# Patient Record
Sex: FEMALE | Race: BLACK OR AFRICAN AMERICAN | NOT HISPANIC OR LATINO | Employment: FULL TIME | URBAN - METROPOLITAN AREA
[De-identification: names, ages, dates, MRNs, and addresses within clinical notes are randomized per-mention and may not be internally consistent; named-entity substitution may affect disease eponyms.]

---

## 2017-01-24 ENCOUNTER — COMMUNICATION - HEALTHEAST (OUTPATIENT)
Dept: FAMILY MEDICINE | Facility: CLINIC | Age: 32
End: 2017-01-24

## 2017-01-24 DIAGNOSIS — F32.9 MAJOR DEPRESSION: ICD-10-CM

## 2017-02-16 ENCOUNTER — COMMUNICATION - HEALTHEAST (OUTPATIENT)
Dept: FAMILY MEDICINE | Facility: CLINIC | Age: 32
End: 2017-02-16

## 2017-02-16 DIAGNOSIS — F32.9 MAJOR DEPRESSION: ICD-10-CM

## 2017-02-17 ENCOUNTER — COMMUNICATION - HEALTHEAST (OUTPATIENT)
Dept: FAMILY MEDICINE | Facility: CLINIC | Age: 32
End: 2017-02-17

## 2017-02-17 DIAGNOSIS — F32.9 MAJOR DEPRESSION: ICD-10-CM

## 2017-03-26 ENCOUNTER — COMMUNICATION - HEALTHEAST (OUTPATIENT)
Dept: FAMILY MEDICINE | Facility: CLINIC | Age: 32
End: 2017-03-26

## 2017-03-26 DIAGNOSIS — F32.9 MAJOR DEPRESSION: ICD-10-CM

## 2017-03-28 ENCOUNTER — COMMUNICATION - HEALTHEAST (OUTPATIENT)
Dept: FAMILY MEDICINE | Facility: CLINIC | Age: 32
End: 2017-03-28

## 2017-03-28 DIAGNOSIS — F32.9 MAJOR DEPRESSION: ICD-10-CM

## 2017-04-05 ENCOUNTER — COMMUNICATION - HEALTHEAST (OUTPATIENT)
Dept: FAMILY MEDICINE | Facility: CLINIC | Age: 32
End: 2017-04-05

## 2017-04-13 ENCOUNTER — COMMUNICATION - HEALTHEAST (OUTPATIENT)
Dept: FAMILY MEDICINE | Facility: CLINIC | Age: 32
End: 2017-04-13

## 2017-04-13 ENCOUNTER — OFFICE VISIT - HEALTHEAST (OUTPATIENT)
Dept: FAMILY MEDICINE | Facility: CLINIC | Age: 32
End: 2017-04-13

## 2017-04-13 DIAGNOSIS — F32.9 MAJOR DEPRESSION: ICD-10-CM

## 2017-04-13 RX ORDER — FERROUS SULFATE 325(65) MG
1 TABLET ORAL DAILY
Status: SHIPPED | COMMUNITY
Start: 2017-04-13

## 2017-10-12 ENCOUNTER — COMMUNICATION - HEALTHEAST (OUTPATIENT)
Dept: FAMILY MEDICINE | Facility: CLINIC | Age: 32
End: 2017-10-12

## 2017-10-12 DIAGNOSIS — F32.9 MAJOR DEPRESSION: ICD-10-CM

## 2017-10-27 ENCOUNTER — OFFICE VISIT - HEALTHEAST (OUTPATIENT)
Dept: FAMILY MEDICINE | Facility: CLINIC | Age: 32
End: 2017-10-27

## 2017-10-27 DIAGNOSIS — Z23 NEEDS FLU SHOT: ICD-10-CM

## 2017-10-27 DIAGNOSIS — F32.9 MAJOR DEPRESSION: ICD-10-CM

## 2017-10-30 ENCOUNTER — COMMUNICATION - HEALTHEAST (OUTPATIENT)
Dept: FAMILY MEDICINE | Facility: CLINIC | Age: 32
End: 2017-10-30

## 2017-10-31 ENCOUNTER — COMMUNICATION - HEALTHEAST (OUTPATIENT)
Dept: SCHEDULING | Facility: CLINIC | Age: 32
End: 2017-10-31

## 2017-11-01 ENCOUNTER — RECORDS - HEALTHEAST (OUTPATIENT)
Dept: ADMINISTRATIVE | Facility: OTHER | Age: 32
End: 2017-11-01

## 2017-11-02 ENCOUNTER — RECORDS - HEALTHEAST (OUTPATIENT)
Dept: ADMINISTRATIVE | Facility: OTHER | Age: 32
End: 2017-11-02

## 2017-11-09 ENCOUNTER — COMMUNICATION - HEALTHEAST (OUTPATIENT)
Dept: FAMILY MEDICINE | Facility: CLINIC | Age: 32
End: 2017-11-09

## 2018-02-08 ENCOUNTER — COMMUNICATION - HEALTHEAST (OUTPATIENT)
Dept: FAMILY MEDICINE | Facility: CLINIC | Age: 33
End: 2018-02-08

## 2018-02-08 DIAGNOSIS — F32.9 MAJOR DEPRESSION: ICD-10-CM

## 2018-04-09 ENCOUNTER — COMMUNICATION - HEALTHEAST (OUTPATIENT)
Dept: FAMILY MEDICINE | Facility: CLINIC | Age: 33
End: 2018-04-09

## 2018-04-09 DIAGNOSIS — F32.9 MAJOR DEPRESSION: ICD-10-CM

## 2018-04-09 RX ORDER — DULOXETIN HYDROCHLORIDE 30 MG/1
30 CAPSULE, DELAYED RELEASE ORAL 2 TIMES DAILY
Qty: 60 CAPSULE | Refills: 5 | Status: SHIPPED | OUTPATIENT
Start: 2018-04-09 | End: 2023-04-20

## 2019-10-11 ENCOUNTER — COMMUNICATION - HEALTHEAST (OUTPATIENT)
Dept: FAMILY MEDICINE | Facility: CLINIC | Age: 34
End: 2019-10-11

## 2021-05-30 VITALS — WEIGHT: 128 LBS | BODY MASS INDEX: 20.98 KG/M2

## 2021-05-31 VITALS — WEIGHT: 118 LBS | BODY MASS INDEX: 19.34 KG/M2

## 2021-06-10 NOTE — PROGRESS NOTES
Assessment and Plan    1. Major depression - improved on medication, but she is still dealing with stressors - declines counseling for now, as she has not time  - DULoxetine (CYMBALTA) 20 MG capsule; Take 1 capsule (20 mg total) by mouth 2 (two) times a day.  Dispense: 180 capsule; Refill: 1    Marycarmen River MD     -------------------------------------------    Chief Complaint   Patient presents with     Depression     I saw Carin on July 1, 2016 for depression and started her on duloxetine.  She thought she did not need a follow up for a year.  On her last [facility] email I did refill her duloxetine for a month but asked her to come in for a follow up visit.  However she has been having problems with the pharmacy she uses  - - Was having trouble with the pharmacy stocking the medicaiton and communicating with her.  They did not fill her 3/28/17 prescription. She also used to work there which makes her extra-uncomfortable, so she has been without duloxetine for  3-4 weeks.  She DOES feel it helped    Little interest or pleasure in doing things: More than half the days  Feeling down, depressed, or hopeless: More than half the days  Trouble falling or staying asleep, or sleeping too much: Nearly every day  Feeling tired or having little energy: More than half the days  Poor appetite or overeating: Not at all  Feeling bad about yourself - or that you are a failure or have let yourself or your family down: Nearly every day  Trouble concentrating on things, such as reading the newspaper or watching television: Nearly every day  Moving or speaking so slowly that other people could have noticed. Or the opposite - being so fidgety or restless that you have been moving around a lot more than usual: Not at all  Thoughts that you would be better off dead, or of hurting yourself in some way: Several days  PHQ-9 Total Score: 16  If you checked off any problems, how difficult have these problems made it for you to do your work,  "take care of things at home, or get along with other people?: Very difficult    Carin felt like things were going well until January this year - then she started school full time - she is going to [employer] for [employer] science.  She has had to find  so that was stressful.  She thought she could handle it all, but then things started to fall apart and not get done around the house.  She had to withdraw from two classes because she was failing.  She dropped down to part-time - which was better but she still feels bad about this.    Her  scaled back on work so he could help around the house - but he doesn't do as much (\"less money and more mess\") - and manages kids differently.  She lives across st[place] from mother in law and a few houses down from sister-in-law - who was sending their kids over.  She has been more clear about limits.    She has a 6 year old son, and 2 year old daughter.  Mother-in-law DOES help out, but she doesn't want to rely on her all the time    She has also told her Mom to back off - Mom wants to spend a LOT of time talking to her (2 hours a day?).  Mom was also saying she didn't believe she was in school, and makes Carin feel guilty for not being able to talk to her.  Carin mentions that she has a diagnosis of borderline personality disorder.     One of her sister-in-laws is supportive and helps her get to appointments    Carin does say her libido is improved since I first saw her      Patient Active Problem List   Diagnosis     Family history of sickle cell anemia     AS (sickle cell trait)     History of anemia     Seasonal allergies         Current Outpatient Prescriptions:      ETONOGESTREL (IMPLANON SDRM), by Subdermal route., Disp: , Rfl:      ferrous sulfate 325 (65 FE) MG tablet, Take 1 tablet by mouth daily., Disp: , Rfl:      UNABLE TO FIND, Take 1 capsule by mouth daily. Med Name: Marti Root, Disp: , Rfl:      DULoxetine (CYMBALTA) 20 MG capsule, Take 1 " capsule (20 mg total) by mouth 2 (two) times a day., Disp: 180 capsule, Rfl: 1      Health Maintenance Due   Topic Date Due     INFLUENZA VACCINE RULE BASED (1) 08/01/2016     PAP SMEAR  02/18/2017     Health Maintenance reviewed -reminded pap due    History   Smoking Status     Never Smoker   Smokeless Tobacco     Never Used       History   Alcohol Use No       Vitals:    04/13/17 1533   BP: 92/56   Pulse: 76   Resp: 16     Body mass index is 20.98 kg/(m^2).     EXAM:    General appearance - alert, well appearing, and in no distress and tired appearing  Mental status - slightly constricted mood, normal behavior, speech, dress, motor activity, and thought processes

## 2021-06-13 NOTE — PROGRESS NOTES
Assessment and Plan    1. Major depression  Could be a little better - increase dose from 20 mg daily to 40 mg daily - If Carin tolerates this I can give a 90 day refill  - DULoxetine 40 mg CpDR; Take 40 mg by mouth daily.  Dispense: 30 capsule; Refill: 2    2. Needs flu shot  - Influenza, Seasonal Quad, Preservative Free 36+ Months    Re teeth grinding - advised dental visit and gave resources for finding dentist office that takes JORGE River MD     -------------------------------------------    Chief Complaint   Patient presents with     Depression     Carin is taking Duloxetine for depression - I had started her on this medication in 6/16 as she also noted lack of libido.  It has helped though at last visit she mentioned some family stressors that were affecting her mood. However she was too busy to pursue counseling    Little interest or pleasure in doing things: Nearly every day  Feeling down, depressed, or hopeless: Several days  Trouble falling or staying asleep, or sleeping too much: Not at all  Feeling tired or having little energy: Nearly every day  Poor appetite or overeating: Several days  Feeling bad about yourself - or that you are a failure or have let yourself or your family down: More than half the days  Trouble concentrating on things, such as reading the newspaper or watching television: Several days  Moving or speaking so slowly that other people could have noticed. Or the opposite - being so fidgety or restless that you have been moving around a lot more than usual: Not at all  Thoughts that you would be better off dead, or of hurting yourself in some way: Not at all  PHQ-9 Total Score: 11    Note that her previous score was 16 in 4/2017    She had planned to go back to school to study computer science but found that this with home responsibilities was to much.  She has a 7 year old son with Down's Syndrome and found that she did not have time to communicate with his  "teacher.    Since then other things have gotten better.  She has a Mom with probable borderline personality disorder - we talked at last visit about setting firm rules (such as when she could call and for what reasons) .  She says her Mom is not liking new rules she has to follow - she cannot call all the time, she cannot make it seem like an emergency - but things are better.  Carin notes that she has had a lot of family manipulation \"if you don't do this for me I will hurt myself,\" but she is getting tougher about dealing with this.    She and her  are now living in a bigger and a nicer house.  She has a better , more comfortable space.  Before they moved they were across the street  from mother in law and husbands sister.  Carin was frequently asked to mind other kids, with no return of the favor.  Now she trades more fairly only with family members who are willing to give back and have common goals for kids.    Her  is getting better at helping around the house/supporting      Other concerns:      Left jaw pain: her  says that she has always ground her teeth and now she has broken some.  She had gone through dental guards - they only last 3 weeks.  She has not yet seen a dentist    Patient Active Problem List   Diagnosis     Family history of sickle cell anemia     AS (sickle cell trait)     History of anemia     Seasonal allergies       Current Outpatient Prescriptions on File Prior to Visit   Medication Sig Dispense Refill     ETONOGESTREL (IMPLANON SDRM) by Subdermal route.       ferrous sulfate 325 (65 FE) MG tablet Take 1 tablet by mouth daily.       UNABLE TO FIND Take 1 capsule by mouth daily. Med Name: Marti Castorena       No current facility-administered medications on file prior to visit.            There are no preventive care reminders to display for this patient.      History   Smoking Status     Never Smoker   Smokeless Tobacco     Never Used       History   Alcohol Use No "         Vitals:    10/27/17 1458   BP: 92/58   Pulse: 66   SpO2: 99%     Body mass index is 19.34 kg/(m^2).     EXAM:    General appearance - alert, well appearing, and in no distress  Mental status - normal mood, behavior, speech, dress, motor activity, and thought processes

## 2021-08-21 ENCOUNTER — HEALTH MAINTENANCE LETTER (OUTPATIENT)
Age: 36
End: 2021-08-21

## 2021-10-16 ENCOUNTER — HEALTH MAINTENANCE LETTER (OUTPATIENT)
Age: 36
End: 2021-10-16

## 2022-10-01 ENCOUNTER — HEALTH MAINTENANCE LETTER (OUTPATIENT)
Age: 37
End: 2022-10-01

## 2023-04-14 ASSESSMENT — PATIENT HEALTH QUESTIONNAIRE - PHQ9
10. IF YOU CHECKED OFF ANY PROBLEMS, HOW DIFFICULT HAVE THESE PROBLEMS MADE IT FOR YOU TO DO YOUR WORK, TAKE CARE OF THINGS AT HOME, OR GET ALONG WITH OTHER PEOPLE: EXTREMELY DIFFICULT
SUM OF ALL RESPONSES TO PHQ QUESTIONS 1-9: 16
SUM OF ALL RESPONSES TO PHQ QUESTIONS 1-9: 16

## 2023-04-18 ENCOUNTER — VIRTUAL VISIT (OUTPATIENT)
Dept: FAMILY MEDICINE | Facility: CLINIC | Age: 38
End: 2023-04-18
Payer: COMMERCIAL

## 2023-04-18 ENCOUNTER — TELEPHONE (OUTPATIENT)
Dept: FAMILY MEDICINE | Facility: CLINIC | Age: 38
End: 2023-04-18

## 2023-04-18 DIAGNOSIS — F33.0 MILD EPISODE OF RECURRENT MAJOR DEPRESSIVE DISORDER (H): Primary | ICD-10-CM

## 2023-04-18 PROCEDURE — 96127 BRIEF EMOTIONAL/BEHAV ASSMT: CPT | Mod: VID | Performed by: FAMILY MEDICINE

## 2023-04-18 PROCEDURE — 99203 OFFICE O/P NEW LOW 30 MIN: CPT | Mod: VID | Performed by: FAMILY MEDICINE

## 2023-04-18 RX ORDER — DULOXETIN HYDROCHLORIDE 30 MG/1
30 CAPSULE, DELAYED RELEASE ORAL 2 TIMES DAILY
Qty: 180 CAPSULE | Refills: 0 | Status: SHIPPED | OUTPATIENT
Start: 2023-04-18 | End: 2023-04-18

## 2023-04-18 ASSESSMENT — PATIENT HEALTH QUESTIONNAIRE - PHQ9
SUM OF ALL RESPONSES TO PHQ QUESTIONS 1-9: 16
10. IF YOU CHECKED OFF ANY PROBLEMS, HOW DIFFICULT HAVE THESE PROBLEMS MADE IT FOR YOU TO DO YOUR WORK, TAKE CARE OF THINGS AT HOME, OR GET ALONG WITH OTHER PEOPLE: EXTREMELY DIFFICULT

## 2023-04-18 NOTE — PROGRESS NOTES
Carin is a 37 year old who is being evaluated via a billable video visit.      How would you like to obtain your AVS? MyChart  If the video visit is dropped, the invitation should be resent by: Text to cell phone: [phone]  Will anyone else be joining your video visit? No        Assessment & Plan     Mild episode of recurrent major depressive disorder (H)  - Comprehensive metabolic panel (BMP + Alb, Alk Phos, ALT, AST, Total. Bili, TP)  I prescribed duloxetine 30 mg twice a day as this is what Carin said she previously tolerated.  Specifically she said taking 60 mg at once was too strong show she found 30 mg twice a day more tolerable.  I got a note from the pharmacy that her insurance does not cover 2 pills daily.  Therefore in a different encounter I sent a prescription for 7 pills at 30 mg, and then she can try going up to 60 mg if she tolerates this.     Depression Screening Follow Up        4/14/2023     2:33 PM   PHQ   PHQ-9 Total Score 16   Q9: Thoughts of better off dead/self-harm past 2 weeks Not at all      Return in about 4 weeks (around 5/16/2023) for Routine preventive, depression follow up.      Marycarmen River MD  [facility]    Subjective   Carin is a 37 year old, presenting for the following health issues:  Recheck Medication and Depression        Depression Followup    How are you doing with your depression since your last visit? No change    Are you having other symptoms that might be associated with depression? No    Have you had a significant life event?  Relationship Concerns and Job Concerns     Are you feeling anxious or having panic attacks?   No    Do you have any concerns with your use of alcohol or other drugs? No    I have not seen [employer] since 2017 so she catches me up on her life.  When I last talked to her she was pursuing training in computer science, but she says ultimately she did not have enough money for school and home and her kids  were not giving her enough care.  Unfortunately, her  spent some about money carelessly.    She is pursuing divorce from her .  He was not helping enough with running the household and not contributing enough financially.  The house was too big to maintain, and while she understands picking up after little kids, she does not want to  after her .  When she has spoken up about her concerns things are 2/10 and her  seems aggressive.  He backs off if she really gets to the point where she is crying; and then he discusses trying to make things work.  Jennifer Park does not feel she should have to cry to make him feel better.  She is currently working 2 jobs.    Also when we last spoke she rated described her mother's controlling nature, which almost sounded to me like borderline personality.  She says that she worked with her mom for a while at [employer]-this was nice but not too taxing.  However it was long hours and she wanted to be home more for her children.    The pandemic was particularly stressful as one of her sons have Down's syndrome.  Virtual school was challenging and also not good for his development.  Her  was not advocating for their son.  She had to fight very hard to get him back in school last year when schools were opening again.    Jennifer Park is interested in starting medication to address her depression.  She says that the duloxetine she previously took made her sweat, but she did feel less trapped when she was taking it.  She has not used it for about 2 years.      Used to check her blood pressure - nurses always telling her it was normal          4/18/2023     6:48 AM   Additional Questions   Roomed by Britni   Accompanied by Alone     History of Present Illness       Mental Health Follow-up:  Patient presents to follow-up on Depression.Patient's depression since last visit has been:  Medium  The patient is not having other symptoms associated with  depression.      Any significant life events: relationship concerns, financial concerns, housing concerns and grief or loss  Patient is not feeling anxious or having panic attacks.  Patient has no concerns about alcohol or drug use.    She eats 0-1 servings of fruits and vegetables daily.She consumes 1 sweetened beverage(s) daily.She exercises with enough effort to increase her heart rate 10 to 19 minutes per day.  She exercises with enough effort to increase her heart rate 3 or less days per week. She is missing 7 dose(s) of medications per week.  She is not taking prescribed medications regularly due to other.    Today's PHQ-9         PHQ-9 Total Score: 16    PHQ-9 Q9 Thoughts of better off dead/self-harm past 2 weeks :   Not at all    How difficult have these problems made it for you to do your work, take care of things at home, or get along with other people: Extremely difficult           Social History     Tobacco Use     Smoking status: Never     Smokeless tobacco: Never   Vaping Use     Vaping status: Never Used   Substance Use Topics     Alcohol use: No     Drug use: No         4/14/2023     2:33 PM   PHQ   PHQ-9 Total Score 16   Q9: Thoughts of better off dead/self-harm past 2 weeks Not at all          View : No data to display.                  4/14/2023     2:33 PM   Last PHQ-9   1.  Little interest or pleasure in doing things 2   2.  Feeling down, depressed, or hopeless 3   3.  Trouble falling or staying asleep, or sleeping too much 3   4.  Feeling tired or having little energy 2   5.  Poor appetite or overeating 3   6.  Feeling bad about yourself 3   7.  Trouble concentrating 0   8.  Moving slowly or restless 0   Q9: Thoughts of better off dead/self-harm past 2 weeks 0   PHQ-9 Total Score 16          View : No data to display.                  Objective           Vitals:  No vitals were obtained today due to virtual visit.    Physical Exam   GENERAL: Healthy, alert and no distress  EYES: Eyes grossly  normal to inspection.  No discharge or erythema, or obvious scleral/conjunctival abnormalities.  RESP: No audible wheeze, cough, or visible cyanosis.  No visible retractions or increased work of breathing.    SKIN: Visible skin clear. No significant rash, abnormal pigmentation or lesions.  NEURO: Cranial nerves grossly intact.  Mentation and speech appropriate for age.  PSYCH: mentation appears normal and Carin appear tired and stressed, but able to smile    Video-Visit Details    Type of service:  Video Visit   Video Start Time: 7:00 AM  Video End Time:7:22 AM    Originating Location (pt. Location): Home  Distant Location (provider location):  On-site  Platform used for Video Visit: [employer]

## 2023-04-19 ENCOUNTER — LAB (OUTPATIENT)
Dept: LAB | Facility: CLINIC | Age: 38
End: 2023-04-19
Payer: COMMERCIAL

## 2023-04-19 DIAGNOSIS — F33.0 MILD EPISODE OF RECURRENT MAJOR DEPRESSIVE DISORDER (H): ICD-10-CM

## 2023-04-19 LAB
ALBUMIN SERPL BCG-MCNC: 4.7 G/DL (ref 3.5–5.2)
ALP SERPL-CCNC: 61 U/L (ref 35–104)
ALT SERPL W P-5'-P-CCNC: 9 U/L (ref 10–35)
ANION GAP SERPL CALCULATED.3IONS-SCNC: 15 MMOL/L (ref 7–15)
AST SERPL W P-5'-P-CCNC: 21 U/L (ref 10–35)
BILIRUB SERPL-MCNC: 0.4 MG/DL
BUN SERPL-MCNC: 5.3 MG/DL (ref 6–20)
CALCIUM SERPL-MCNC: 9.3 MG/DL (ref 8.6–10)
CHLORIDE SERPL-SCNC: 101 MMOL/L (ref 98–107)
CREAT SERPL-MCNC: 0.82 MG/DL (ref 0.51–0.95)
DEPRECATED HCO3 PLAS-SCNC: 21 MMOL/L (ref 22–29)
GFR SERPL CREATININE-BSD FRML MDRD: >90 ML/MIN/1.73M2
GLUCOSE SERPL-MCNC: 120 MG/DL (ref 70–99)
POTASSIUM SERPL-SCNC: 4.5 MMOL/L (ref 3.4–5.3)
PROT SERPL-MCNC: 7.3 G/DL (ref 6.4–8.3)
SODIUM SERPL-SCNC: 137 MMOL/L (ref 136–145)

## 2023-04-19 PROCEDURE — 36415 COLL VENOUS BLD VENIPUNCTURE: CPT

## 2023-04-19 PROCEDURE — 80053 COMPREHEN METABOLIC PANEL: CPT

## 2023-04-24 NOTE — TELEPHONE ENCOUNTER
PA Initiation    Medication: DULoxetine (CYMBALTA) 30 MG capsule - initiated  Insurance Company: [employer] ([facility]) - Phone [phone] Fax [phone]  Pharmacy Filling the Rx: [id number] - [place], [place] - [address]  Filling Pharmacy Phone: [phone]  Filling Pharmacy Fax:    Start Date: 4/24/2023

## 2023-04-26 NOTE — TELEPHONE ENCOUNTER
Prior Authorization Approval    Authorization Effective Date: 4/24/2023  Authorization Expiration Date: 4/24/2023  Medication: DULoxetine (CYMBALTA) 30 MG capsule - APPROVED  Approved Dose/Quantity: 60 FOR 30 DAYS  Insurance Company: [employer] ([facility]) - Phone [phone] Fax [phone]  Which Pharmacy is filling the prescription (Not needed for infusion/clinic administered): [id number] - [place], [place] - [address] [place] [address]  Pharmacy Notified: Yes  Patient Notified: Yes Pharmacy will notify patient once order is ready.

## 2023-07-11 ENCOUNTER — ALLIED HEALTH/NURSE VISIT (OUTPATIENT)
Dept: FAMILY MEDICINE | Facility: CLINIC | Age: 38
End: 2023-07-11
Payer: COMMERCIAL

## 2023-07-11 VITALS
BODY MASS INDEX: 19.99 KG/M2 | OXYGEN SATURATION: 100 % | WEIGHT: 122 LBS | DIASTOLIC BLOOD PRESSURE: 75 MMHG | RESPIRATION RATE: 24 BRPM | TEMPERATURE: 98.5 F | HEART RATE: 98 BPM | SYSTOLIC BLOOD PRESSURE: 113 MMHG

## 2023-07-11 DIAGNOSIS — Z23 HIGH PRIORITY FOR 2019-NCOV VACCINE: Primary | ICD-10-CM

## 2023-07-11 PROCEDURE — 0134A COVID-19 BIVALENT 18+ (MODERNA): CPT

## 2023-07-11 PROCEDURE — 99207 PR NO CHARGE NURSE ONLY: CPT

## 2023-07-11 PROCEDURE — 91313 COVID-19 BIVALENT 18+ (MODERNA): CPT

## 2023-07-11 NOTE — PROGRESS NOTES
Prior to immunization administration, verified patients identity using patient s name and date of birth. Please see Immunization Activity for additional information.     Screening Questionnaire for Adult Immunization    Are you sick today?   No   Do you have allergies to medications, food, a vaccine component or latex?   Yes   Have you ever had a serious reaction after receiving a vaccination?   No   Do you have a long-term health problem with heart, lung, kidney, or metabolic disease (e.g., diabetes), asthma, a blood disorder, no spleen, complement component deficiency, a cochlear implant, or a spinal fluid leak?  Are you on long-term aspirin therapy?   No   Do you have cancer, leukemia, HIV/AIDS, or any other immune system problem?   No   Do you have a parent, brother, or sister with an immune system problem?   No   In the past 3 months, have you taken medications that affect  your immune system, such as prednisone, other steroids, or anticancer drugs; drugs for the treatment of rheumatoid arthritis, Crohn s disease, or psoriasis; or have you had radiation treatments?   No   Have you had a seizure, or a brain or other nervous system problem?   No   During the past year, have you received a transfusion of blood or blood    products, or been given immune (gamma) globulin or antiviral drug?   No   For women: Are you pregnant or is there a chance you could become       pregnant during the next month?   No   Have you received any vaccinations in the past 4 weeks?   No     Immunization questionnaire was positive for at least one answer.  Notified Dr. Adkins.    Nilesh Castillo Bivalent 18+.     Patient instructed to remain in clinic for 15 minutes afterwards, and to report any adverse reactions.     Screening performed by Veronica Hoang MA on 7/11/2023 at 10:50 AM.

## 2023-10-15 ENCOUNTER — HEALTH MAINTENANCE LETTER (OUTPATIENT)
Age: 38
End: 2023-10-15

## 2024-12-07 ENCOUNTER — HEALTH MAINTENANCE LETTER (OUTPATIENT)
Age: 39
End: 2024-12-07